# Patient Record
(demographics unavailable — no encounter records)

---

## 2024-12-13 NOTE — HISTORY OF PRESENT ILLNESS
[FreeTextEntry1] : 64yo M presents to f/u scrotal hematoma which accidentally formed after angioplasty complication 2 months ago. He states swelling was size of softball, now has gotten much better. He still reports pain

## 2024-12-13 NOTE — PHYSICAL EXAM
[Normal Appearance] : normal appearance [Well Groomed] : well groomed [General Appearance - In No Acute Distress] : no acute distress [Edema] : no peripheral edema [Respiration, Rhythm And Depth] : normal respiratory rhythm and effort [Exaggerated Use Of Accessory Muscles For Inspiration] : no accessory muscle use [Normal Station and Gait] : the gait and station were normal for the patient's age [] : no rash [No Focal Deficits] : no focal deficits [Oriented To Time, Place, And Person] : oriented to person, place, and time [Affect] : the affect was normal [Mood] : the mood was normal [de-identified] : right scrotal firmness and tenderness. Unable to fully appreciate testicle [Chaperone Present] : A chaperone was present in the examining room during all aspects of the physical examination [FreeTextEntry2] : Moe ORELLANA

## 2024-12-13 NOTE — PHYSICAL EXAM
[Normal Appearance] : normal appearance [Well Groomed] : well groomed [General Appearance - In No Acute Distress] : no acute distress [Edema] : no peripheral edema [Respiration, Rhythm And Depth] : normal respiratory rhythm and effort [Exaggerated Use Of Accessory Muscles For Inspiration] : no accessory muscle use [Normal Station and Gait] : the gait and station were normal for the patient's age [] : no rash [No Focal Deficits] : no focal deficits [Oriented To Time, Place, And Person] : oriented to person, place, and time [Affect] : the affect was normal [Mood] : the mood was normal [de-identified] : right scrotal firmness and tenderness. Unable to fully appreciate testicle [Chaperone Present] : A chaperone was present in the examining room during all aspects of the physical examination [FreeTextEntry2] : Moe ORELLANA

## 2024-12-13 NOTE — HISTORY OF PRESENT ILLNESS
[FreeTextEntry1] : 66yo M presents to f/u scrotal hematoma which accidentally formed after angioplasty complication 2 months ago. He states swelling was size of softball, now has gotten much better. He still reports pain

## 2024-12-13 NOTE — PHYSICAL EXAM
[Normal Appearance] : normal appearance [Well Groomed] : well groomed [General Appearance - In No Acute Distress] : no acute distress [Edema] : no peripheral edema [Respiration, Rhythm And Depth] : normal respiratory rhythm and effort [Exaggerated Use Of Accessory Muscles For Inspiration] : no accessory muscle use [Normal Station and Gait] : the gait and station were normal for the patient's age [] : no rash [No Focal Deficits] : no focal deficits [Oriented To Time, Place, And Person] : oriented to person, place, and time [Affect] : the affect was normal [Mood] : the mood was normal [de-identified] : right scrotal firmness and tenderness. Unable to fully appreciate testicle [Chaperone Present] : A chaperone was present in the examining room during all aspects of the physical examination [FreeTextEntry2] : Moe ORELLANA

## 2025-01-23 NOTE — HISTORY OF PRESENT ILLNESS
[FreeTextEntry1] : 66 yo male PMHx DM II, aortic stenosis, HLD, HTN, s/p CABG, presents for evaluation of PAD. Pt has a chronic left plantar foot wound. He had an angiogram with outside surgeon who states he was unable to access the anterior tibial artery. He had a left 1st toe amputation in July. He developed a left plantar foot wound in August 2024. He is currently on antibiotics. He denies any fever or chills.

## 2025-01-23 NOTE — ASSESSMENT
[FreeTextEntry1] : 64 yo male with chronic non-healing left foot wound and PAD.   Pt counseled above diagnosis  Plan for left leg angiogram with access via anterior tibial artery. The risks and benefits of the surgical procedure were discussed with patient, all questions were answered.  A total of 30 minutes was spent with patient and coordinating care  Medication instructions for angiogram:  Take 1/2 dose night time insulin and no insulin in morning of procedure. Do not take Trulicity for 3 days prior to procedure.

## 2025-02-13 NOTE — ASSESSMENT
[FreeTextEntry1] : 66 yo male with chronic non-healing left foot wound and PAD.   Pt counseled above diagnosis  Pt given rx for levofloxacin  Given rx for percocet and tramadol  Plan for left leg angiogram with access via anterior tibial artery. The risks and benefits of the surgical procedure were discussed with patient, all questions were answered.  A total of 30 minutes was spent with patient and coordinating care  Medication instructions for angiogram:  Take 1/2 dose night time insulin and no insulin in morning of procedure. Do not take Trulicity for 3 days prior to procedure.

## 2025-02-13 NOTE — PHYSICAL EXAM
[2+] : right 2+ [0] : left 0 [FreeTextEntry1] : left plantar foot wound 3 cm x 3 cm with granulation tissue in wound base

## 2025-02-13 NOTE — HISTORY OF PRESENT ILLNESS
[FreeTextEntry1] : 1/23/25: 66 yo male PMHx DM II, aortic stenosis, HLD, HTN, s/p CABG, presents for evaluation of PAD. Pt has a chronic left plantar foot wound. He had an angiogram with outside surgeon who states he was unable to access the anterior tibial artery. He had a left 1st toe amputation in July. He developed a left plantar foot wound in August 2024. He is currently on antibiotics. He denies any fever or chills.   2/13/25: Pt has increased pain in left foot. He also feels it has increased discharge. He has been having some chills, did not take his temperature.

## 2025-03-12 NOTE — ASSESSMENT
[FreeTextEntry1] : This patient with left foot osteomyelitis, and diabetes.  At the current time, we will maintain the course of Zosyn 4.5 g every 8 hours through April 6, 2025.  Will follow-up on his labs.  He is pending lab draws this week.  But due to his appointments with different doctors he was unable to arrange for home draw.    He needs to continue follow-up with endocrine for his diabetes to ensure that is in good control.  He understands.  We talked about foot amputation in general.  I expressed that in my opinion, it would be beneficial to try to save as much foot as possible.  1 amputation can lead to subsequent amputations if not will care for the future.  He understands.   Unable to assess

## 2025-03-12 NOTE — PHYSICAL EXAM
[General Appearance - Alert] : alert [General Appearance - In No Acute Distress] : in no acute distress [Sclera] : the sclera and conjunctiva were normal [PERRL With Normal Accommodation] : pupils were equal in size, round, reactive to light [Extraocular Movements] : extraocular movements were intact [Outer Ear] : the ears and nose were normal in appearance [Oropharynx] : the oropharynx was normal with no thrush [Neck Appearance] : the appearance of the neck was normal [Neck Cervical Mass (___cm)] : no neck mass was observed [Jugular Venous Distention Increased] : there was no jugular-venous distention [Thyroid Diffuse Enlargement] : the thyroid was not enlarged [Auscultation Breath Sounds / Voice Sounds] : lungs were clear to auscultation bilaterally [Heart Rate And Rhythm] : heart rate was normal and rhythm regular [Heart Sounds] : normal S1 and S2 [Murmurs] : no murmurs [Heart Sounds Gallop] : no gallops [Heart Sounds Pericardial Friction Rub] : no pericardial rub [Full Pulse] : the pedal pulses are present [Edema] : there was no peripheral edema [Bowel Sounds] : normal bowel sounds [Abdomen Soft] : soft [Abdomen Tenderness] : non-tender [Abdomen Mass (___ Cm)] : no abdominal mass palpated [Costovertebral Angle Tenderness] : no CVA tenderness [No Palpable Adenopathy] : no palpable adenopathy [Musculoskeletal - Swelling] : no joint swelling [Nail Clubbing] : no clubbing  or cyanosis of the fingernails [Motor Tone] : muscle strength and tone were normal [Skin Color & Pigmentation] : normal skin color and pigmentation [] : no rash [Cranial Nerves] : cranial nerves 2-12 were intact [No Focal Deficits] : no focal deficits [FreeTextEntry1] : Decree sensation of the feet. [Oriented To Time, Place, And Person] : oriented to person, place, and time [Affect] : the affect was normal

## 2025-03-12 NOTE — HISTORY OF PRESENT ILLNESS
[FreeTextEntry1] : This 65 year old M with PMHx of including but not limited to alcohol use, diabetes mellitus (on insulin), CHFrEF (20%), moderate to severe aortic stenosis, chronic left lower extremity wound, presents to the ER due to shortness of breath that has been worsening last few days. States he does feel relief when sleep at a recline. Last alcohol drink was previous evening around 8.30PM. Pt denies any diaphoresis, chest pain or tremors.  During ER course, cardiology was consulted. Patient will be admitted to medicine for further care. .he is breathing better now. ID called on 2/24/25 for a non healing LEFT foot ulcer. Per patient ulcer has been present since 8/2024. he is a diabetic. not the best control. A1C is 8.3His blood sugars are very uncontrolled and have been coming down with help of endocrine team. He previously followed an outpatient doctor and was on levaquin. .    Plan #Diabetic foot ulcer with infection.  #uncontrolled DM #suspicious for OM# chronic osteomyelitis-ESR 59 --> 73-CRP    3.29 --> 1.98  Regarding Diabetes and Vascular disease- needs adequate diabetes control to assist in infection control- most recent A1c is: 8.5S/P LLE angio and balloon angioplasty, LLE DP and AT signals on 2/27/25- Endocrine is following- continue to encourage adherence to diet and regimen  NEEDS to stop ETOH use, he will consider Patient intends on work as a  when he gets better. Plan:6 weeks of IV antibiotics he agrees  will need PICC Line  Zosyn (PIPERA TAZO) 4.5 grams Q 8H thru April 6, 2025  ** please note the updated dose. weekly labs: CBC w diff and CMP, ESR and CRP  last seen on 2/28/25  Since the last visit in the hospital he feels pretty well.  No fevers no chills.  His PICC line is functioning properly in the right arm.

## 2025-03-13 NOTE — ASSESSMENT
[FreeTextEntry1] : 66 yo male with LLE angiogram with posterior tibial artery angioplasty, chronic non-healing left foot wound and PAD.   Pt counseled on procedure performed  LLE UNNA boot applied. Santyl to ulcer RTC in 1 week to monitor wound

## 2025-03-13 NOTE — HISTORY OF PRESENT ILLNESS
[FreeTextEntry1] : 1/23/25: 64 yo male PMHx DM II, aortic stenosis, HLD, HTN, s/p CABG, presents for evaluation of PAD. Pt has a chronic left plantar foot wound. He had an angiogram with outside surgeon who states he was unable to access the anterior tibial artery. He had a left 1st toe amputation in July. He developed a left plantar foot wound in August 2024. He is currently on antibiotics. He denies any fever or chills.   2/13/25: Pt has increased pain in left foot. He also feels it has increased discharge. He has been having some chills, did not take his temperature.   3/13/25: Pt is s/p LLE angiogram with posterior tibial artery angioplasty. He has LLE edema. Feels the wound is about the same. No fever or chills.   PSHx:  2/27/25 LLE angiogram with posterior tibial artery angioplasty

## 2025-03-20 NOTE — HISTORY OF PRESENT ILLNESS
[FreeTextEntry1] : 1/23/25: 64 yo male PMHx DM II, aortic stenosis, HLD, HTN, s/p CABG, presents for evaluation of PAD. Pt has a chronic left plantar foot wound. He had an angiogram with outside surgeon who states he was unable to access the anterior tibial artery. He had a left 1st toe amputation in July. He developed a left plantar foot wound in August 2024. He is currently on antibiotics. He denies any fever or chills.   2/13/25: Pt has increased pain in left foot. He also feels it has increased discharge. He has been having some chills, did not take his temperature.   3/13/25: Pt is s/p LLE angiogram with posterior tibial artery angioplasty. He has LLE edema. Feels the wound is about the same. No fever or chills.   3/20/25: Pt had LLE UNNA boot for the past week. Feels the wound is about the same. No fever or chills.   PSHx:  2/27/25 LLE angiogram with posterior tibial artery angioplasty

## 2025-03-20 NOTE — ASSESSMENT
[FreeTextEntry1] : 64 yo male with LLE angiogram with posterior tibial artery angioplasty, chronic non-healing left foot wound and PAD.   Pt counseled on procedure performed  LLE UNNA boot applied. silvercele to wound. UNNA boot, abd pad.  RTC in 1 week to monitor wound

## 2025-04-02 NOTE — HISTORY OF PRESENT ILLNESS
[FreeTextEntry1] : This 65 year old M with PMHx of including but not limited to alcohol use, diabetes mellitus (on insulin), CHFrEF (20%), moderate to severe aortic stenosis, chronic left lower extremity wound, presents to the ER due to shortness of breath that has been worsening last few days. States he does feel relief when sleep at a recline. Last alcohol drink was previous evening around 8.30PM. Pt denies any diaphoresis, chest pain or tremors.  During ER course, cardiology was consulted. Patient will be admitted to medicine for further care. .he is breathing better now. ID called on 2/24/25 for a non healing LEFT foot ulcer. Per patient ulcer has been present since 8/2024. he is a diabetic. not the best control. A1C is 8.3His blood sugars are very uncontrolled and have been coming down with help of endocrine team. He previously followed an outpatient doctor and was on levaquin. .  He was seen in the hospital for diabetic foot infection of ulcer.  Suspicious for osteomyelitis.  At that time,   He was changed over to Zosyn (PIPERA TAZO) 4.5 grams Q 8H thru April 6, 2025 he was seen in the office for follow-up on March 12, 2025.  At that time the foot appears stable.  He is here for second follow-up.  He feels increased pain and recent foot movements.  He had a recent imaging that shows possible fracture in one of the bones.  He also tells me that there is increased drainage, foul-smelling.

## 2025-04-02 NOTE — ASSESSMENT
[FreeTextEntry1] : This patient with left foot osteomyelitis, and diabetes.  Originally, the plan was Zosyn 4.5 g every 8 hours through April 6, 2025. His foot has not improved significantly.  With worsening smell and odor, and mucoid discharge.  Will change the antibiotics over to meropenem 2 g every 8 hours.  Will do this for 28 days, ending around April 30, 2025.  We have informed [facility], same labs: CBC, CMP, ESR, CRP.  Patient is to come back here in about 2 weeks.  He understands.

## 2025-04-02 NOTE — DISCUSSION/SUMMARY
[FreeTextEntry1] : Spoke with Dianne at [facility] and advised them as per Dr. Godoy stop Zosyn and switch pt. to Meropenem 2 Grams every 8 hours x28 days and keep lab orders the same. This is pt's first dose of Meropenem and as per Dr. Godoy ok to order Benadryl IM PRN as per protocol.  As per Dianne anticipated start date will be 4/3/25 and confirmed with Dr. Godoy end date would be 4/30/25.

## 2025-04-02 NOTE — REVIEW OF SYSTEMS
[Joint Pain] : joint pain [As Noted in HPI] : as noted in HPI [Skin Wound] : skin wound [Negative] : Heme/Lymph

## 2025-04-02 NOTE — PHYSICAL EXAM
[General Appearance - Alert] : alert [General Appearance - In No Acute Distress] : in no acute distress [Sclera] : the sclera and conjunctiva were normal [PERRL With Normal Accommodation] : pupils were equal in size, round, reactive to light [Extraocular Movements] : extraocular movements were intact [Outer Ear] : the ears and nose were normal in appearance [Oropharynx] : the oropharynx was normal with no thrush [Neck Appearance] : the appearance of the neck was normal [Neck Cervical Mass (___cm)] : no neck mass was observed [Jugular Venous Distention Increased] : there was no jugular-venous distention [Thyroid Diffuse Enlargement] : the thyroid was not enlarged [Auscultation Breath Sounds / Voice Sounds] : lungs were clear to auscultation bilaterally [Heart Sounds] : normal S1 and S2 [Heart Rate And Rhythm] : heart rate was normal and rhythm regular [Heart Sounds Gallop] : no gallops [Murmurs] : no murmurs [Full Pulse] : the pedal pulses are present [Heart Sounds Pericardial Friction Rub] : no pericardial rub [Edema] : there was no peripheral edema [Bowel Sounds] : normal bowel sounds [Abdomen Soft] : soft [Abdomen Tenderness] : non-tender [Abdomen Mass (___ Cm)] : no abdominal mass palpated [Costovertebral Angle Tenderness] : no CVA tenderness [No Palpable Adenopathy] : no palpable adenopathy [Musculoskeletal - Swelling] : no joint swelling [Nail Clubbing] : no clubbing  or cyanosis of the fingernails [Motor Tone] : muscle strength and tone were normal [Skin Color & Pigmentation] : normal skin color and pigmentation [] : no rash [Cranial Nerves] : cranial nerves 2-12 were intact [No Focal Deficits] : no focal deficits [FreeTextEntry1] : Decree sensation of the feet. [Oriented To Time, Place, And Person] : oriented to person, place, and time [Affect] : the affect was normal

## 2025-04-16 NOTE — PHYSICAL EXAM
[General Appearance - Alert] : alert [General Appearance - In No Acute Distress] : in no acute distress [Sclera] : the sclera and conjunctiva were normal [PERRL With Normal Accommodation] : pupils were equal in size, round, reactive to light [Extraocular Movements] : extraocular movements were intact [Outer Ear] : the ears and nose were normal in appearance [Oropharynx] : the oropharynx was normal with no thrush [Neck Appearance] : the appearance of the neck was normal [Neck Cervical Mass (___cm)] : no neck mass was observed [Jugular Venous Distention Increased] : there was no jugular-venous distention [Thyroid Diffuse Enlargement] : the thyroid was not enlarged [Auscultation Breath Sounds / Voice Sounds] : lungs were clear to auscultation bilaterally [Heart Rate And Rhythm] : heart rate was normal and rhythm regular [Heart Sounds] : normal S1 and S2 [Heart Sounds Gallop] : no gallops [Murmurs] : no murmurs [Heart Sounds Pericardial Friction Rub] : no pericardial rub [Full Pulse] : the pedal pulses are present [Edema] : there was no peripheral edema [Bowel Sounds] : normal bowel sounds [Abdomen Soft] : soft [Abdomen Tenderness] : non-tender [Abdomen Mass (___ Cm)] : no abdominal mass palpated [Costovertebral Angle Tenderness] : no CVA tenderness [No Palpable Adenopathy] : no palpable adenopathy [Musculoskeletal - Swelling] : no joint swelling [Nail Clubbing] : no clubbing  or cyanosis of the fingernails [Motor Tone] : muscle strength and tone were normal [Skin Color & Pigmentation] : normal skin color and pigmentation [] : no rash [Cranial Nerves] : cranial nerves 2-12 were intact [No Focal Deficits] : no focal deficits [Oriented To Time, Place, And Person] : oriented to person, place, and time [Affect] : the affect was normal [FreeTextEntry1] : Decree sensation of the feet.

## 2025-04-16 NOTE — DISCUSSION/SUMMARY
[FreeTextEntry1] : Spoke with Dianne at [facility] and advised them as per Dr. Godoy stop Zosyn and switch pt. to Meropenem 2 Grams every 8 hours x28 days and keep lab orders the same. This is pt's first dose of Meropenem and as per Dr. Godoy ok to order Benadryl IM PRN as per protocol.  As per Dianne anticipated start date will be 4/3/25 and confirmed with Dr. Godoy end date would be 4/30/25.

## 2025-04-16 NOTE — HISTORY OF PRESENT ILLNESS
[FreeTextEntry1] : This 65 year old M with PMHx of including but not limited to alcohol use, diabetes mellitus (on insulin), CHFrEF (20%), moderate to severe aortic stenosis, chronic left lower extremity wound, presents to the ER due to shortness of breath that has been worsening last few days. States he does feel relief when sleep at a recline. Last alcohol drink was previous evening around 8.30PM. Pt denies any diaphoresis, chest pain or tremors.  During ER course, cardiology was consulted. Patient will be admitted to medicine for further care. .he is breathing better now. ID called on 2/24/25 for a non healing LEFT foot ulcer. Per patient ulcer has been present since 8/2024. he is a diabetic. not the best control. A1C is 8.3His blood sugars are very uncontrolled and have been coming down with help of endocrine team. He previously followed an outpatient doctor and was on levaquin. .  He was seen in the hospital for diabetic foot infection of ulcer.  Suspicious for osteomyelitis.  At that time,   He was changed over to Zosyn (PIPERA TAZO) 4.5 grams Q 8H thru April 6, 2025 he was seen in the office for follow-up on March 12, 2025.  He had a second follow-up on April 2, 2025.  Afterwards he had follow-up with his vascular surgeon. About 1 week ago on April 10, 2025 he had surgery done and had a transmetatarsal amputation done at [facility].  It is still a lot of pain.  He has been walking on the stump.  And using his diesel truck.

## 2025-04-16 NOTE — ASSESSMENT
[FreeTextEntry1] : This patient with left foot osteomyelitis, and diabetes. Originally, the plan was Zosyn 4.5 g every 8 hours through April 6, 2025. then changed to meropenem 2 g every 8 hours.  thru April 30, 2025   s/p transmetatarsal amputation on 4/10/2025 at [facility] with Dr. Wright's colleague  Currently he is on doxycycline by mouth.  Will add Augmentin twice daily.  I spoke to the patient at length regarding weightbearing situation, asking when to use this walker accordingly.  This will help allow the foot to heal properly.  I explained to him that the skin will heal first, but it takes a long time for the connective tissue to properly reorganize and heal.  He understands.  I asked him to come back in 2 weeks for a reassessment.

## 2025-04-30 NOTE — DISCUSSION/SUMMARY
[FreeTextEntry1] : Spoke with Dianne at [facility] and advised them as per Dr. Godoy stop Zosyn and switch pt. to Meropenem 2 Grams every 8 hours x28 days and keep lab orders the same. This is pt's first dose of Meropenem and as per Dr. Godoy ok to order Benadryl IM PRN as per protocol.  As per Dianne anticipated start date will be 4/3/25 and confirmed with Dr. Godoy end date would be 4/30/25.

## 2025-04-30 NOTE — DISCUSSION/SUMMARY
[FreeTextEntry1] : Spoke with Dianne at [facility] and advised them as per Dr. Godoy stop Zosyn and switch pt. to Meropenem 2 Grams every 8 hours x28 days and keep lab orders the same. This is pt's first dose of Meropenem and as per Dr. Godoy ok to order Benadryl IM PRN as per protocol.  As per Dianne anticipated start date will be 4/3/25 and confirmed with Dr. Godoy end date would be 4/30/25.

## 2025-04-30 NOTE — PHYSICAL EXAM
[General Appearance - Alert] : alert [General Appearance - In No Acute Distress] : in no acute distress [Sclera] : the sclera and conjunctiva were normal [PERRL With Normal Accommodation] : pupils were equal in size, round, reactive to light [Extraocular Movements] : extraocular movements were intact [Outer Ear] : the ears and nose were normal in appearance [Oropharynx] : the oropharynx was normal with no thrush [Neck Appearance] : the appearance of the neck was normal [Neck Cervical Mass (___cm)] : no neck mass was observed [Jugular Venous Distention Increased] : there was no jugular-venous distention [Thyroid Diffuse Enlargement] : the thyroid was not enlarged [Auscultation Breath Sounds / Voice Sounds] : lungs were clear to auscultation bilaterally [Heart Rate And Rhythm] : heart rate was normal and rhythm regular [Heart Sounds] : normal S1 and S2 [Heart Sounds Gallop] : no gallops [Murmurs] : no murmurs [Heart Sounds Pericardial Friction Rub] : no pericardial rub [Full Pulse] : the pedal pulses are present [Edema] : there was no peripheral edema [Bowel Sounds] : normal bowel sounds [Abdomen Soft] : soft [Abdomen Tenderness] : non-tender [Abdomen Mass (___ Cm)] : no abdominal mass palpated [Costovertebral Angle Tenderness] : no CVA tenderness [No Palpable Adenopathy] : no palpable adenopathy [Musculoskeletal - Swelling] : no joint swelling [Nail Clubbing] : no clubbing  or cyanosis of the fingernails [Motor Tone] : muscle strength and tone were normal [Skin Color & Pigmentation] : normal skin color and pigmentation [] : no rash [Cranial Nerves] : cranial nerves 2-12 were intact [No Focal Deficits] : no focal deficits [Oriented To Time, Place, And Person] : oriented to person, place, and time [Affect] : the affect was normal [FreeTextEntry1] : decreaed sensation of the feet.

## 2025-04-30 NOTE — ASSESSMENT
[FreeTextEntry1] : Plan:  Continue wound care.  Suggest wound care once daily if possible and apply some iodine/Betadine to the area to keep it sanitary.  I have suggested to continue taking oral Augmentin twice a day to 875 mg, doxycycline 100 mg twice a day.  He can come back in about 2 weeks.  I want to stop the antibiotics once the wound is completely healed.  He understands.  We spoke about sun sensitization of the skin on the doxycycline.  I asked him to use sunblock when going outdoors on the doxycycline.  He understands.

## 2025-04-30 NOTE — HISTORY OF PRESENT ILLNESS
[FreeTextEntry1] : This is a 65-year-old man with history of alcohol use, diabetes, CHF with reduced EF of 20%, moderate to severe aortic stenosis, chronic left lower extremity wound,  He was seen in the hospital on 2/24/2025 for a nonhealing left foot ulcer that been present since August 2024.  He had previously been given a course of Levaquin with no significant improvement.  At that visit his A1c was 8.3.  There was a suspicion for osteomyelitis at that time.  He initially been recommended a course of Zosyn 4.5 g every 8 hours via IV line through April 6, 2025.  He had follow-up in the office before the IV antibiotics..  His first visit was on 3/12/2025, a second visit on 4/2/2025. During his 4/2/2025 visit, there was poor wound healing, and the Zosyn was changed over to meropenem with the proposed end date of 4/30/2025.  Circa 4/10/2025, patient had a transmetatarsal amputation done at [facility] with Dr. Wright's colleague.  He was last seen here on 4/16/2025.  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 We decided to give him Augmentin and doxycycline to ensure good wound healing.  Since last visit, he still taking the Augmentin.  But he ran out of doxycycline.  His surgeon has taken a few more sutures.    He is getting wound care For health services, he is not walking on his foot as much as he was before and taking it a lot easier than prior.

## 2025-04-30 NOTE — HISTORY OF PRESENT ILLNESS
[FreeTextEntry1] : This is a 65-year-old man with history of alcohol use, diabetes, CHF with reduced EF of 20%, moderate to severe aortic stenosis, chronic left lower extremity wound,  He was seen in the hospital on 2/24/2025 for a nonhealing left foot ulcer that been present since August 2024.  He had previously been given a course of Levaquin with no significant improvement.  At that visit his A1c was 8.3.  There was a suspicion for osteomyelitis at that time.  He initially been recommended a course of Zosyn 4.5 g every 8 hours via IV line through April 6, 2025.  He had follow-up in the office before the IV antibiotics..  His first visit was on 3/12/2025, a second visit on 4/2/2025. During his 4/2/2025 visit, there was poor wound healing, and the Zosyn was changed over to meropenem with the proposed end date of 4/30/2025.  Circa 4/10/2025, patient had a transmetatarsal amputation done at [facility] with Dr. Wright's colleague.  He was last seen here on 4/16/2025.  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 We decided to give him Augmentin and doxycycline to ensure good wound healing.  Since last visit, he still taking the Augmentin.  But he ran out of doxycycline.  His surgeon has taken a few more sutures.    He is getting wound care For health services, he is not walking on his foot as much as he was before and taking it a lot easier than prior.

## 2025-05-14 NOTE — DISCUSSION/SUMMARY
[FreeTextEntry1] : Spoke with Dianne at [facility] and advised them as per Dr. Godoy stop Zosyn and switch pt. to Meropenem 2 Grams every 8 hours x28 days and keep lab orders the same. This is pt's first dose of Meropenem and as per Dr. Godoy ok to order Benadryl IM PRN as per protocol.  As per Dianne anticipated start date will be 4/3/25 and confirmed with Dr. Godoy end date would be 4/30/25.

## 2025-05-14 NOTE — PHYSICAL EXAM
[General Appearance - Alert] : alert [General Appearance - In No Acute Distress] : in no acute distress [Sclera] : the sclera and conjunctiva were normal [PERRL With Normal Accommodation] : pupils were equal in size, round, reactive to light [Extraocular Movements] : extraocular movements were intact [Outer Ear] : the ears and nose were normal in appearance [Oropharynx] : the oropharynx was normal with no thrush [Neck Appearance] : the appearance of the neck was normal [Neck Cervical Mass (___cm)] : no neck mass was observed [Jugular Venous Distention Increased] : there was no jugular-venous distention [Thyroid Diffuse Enlargement] : the thyroid was not enlarged [Auscultation Breath Sounds / Voice Sounds] : lungs were clear to auscultation bilaterally [Heart Rate And Rhythm] : heart rate was normal and rhythm regular [Heart Sounds] : normal S1 and S2 [Heart Sounds Gallop] : no gallops [Murmurs] : no murmurs [Heart Sounds Pericardial Friction Rub] : no pericardial rub [Full Pulse] : the pedal pulses are present [Edema] : there was no peripheral edema [Abdomen Soft] : soft [Bowel Sounds] : normal bowel sounds [Abdomen Tenderness] : non-tender [Abdomen Mass (___ Cm)] : no abdominal mass palpated [Costovertebral Angle Tenderness] : no CVA tenderness [No Palpable Adenopathy] : no palpable adenopathy [Nail Clubbing] : no clubbing  or cyanosis of the fingernails [Musculoskeletal - Swelling] : no joint swelling [Motor Tone] : muscle strength and tone were normal [Skin Color & Pigmentation] : normal skin color and pigmentation [] : no rash [Cranial Nerves] : cranial nerves 2-12 were intact [No Focal Deficits] : no focal deficits [Oriented To Time, Place, And Person] : oriented to person, place, and time [Affect] : the affect was normal [FreeTextEntry1] : decreaed sensation of the feet.

## 2025-05-14 NOTE — HISTORY OF PRESENT ILLNESS
[FreeTextEntry1] : This is a 65-year-old man with history of alcohol use, diabetes, CHF with reduced EF of 20%, moderate to severe aortic stenosis, chronic left lower extremity wound,  He was seen in the hospital on 2/24/2025 for a nonhealing left foot ulcer that been present since August 2024.  He had previously been given a course of Levaquin with no significant improvement.  At that visit his A1c was 8.3.  There was a suspicion for osteomyelitis at that time.  He initially been recommended a course of Zosyn 4.5 g every 8 hours via IV line through April 6, 2025.  He had follow-up in the office before the IV antibiotics..  His first visit was on 3/12/2025, a second visit on 4/2/2025. During his 4/2/2025 visit, there was poor wound healing, and the Zosyn was changed over to meropenem with the proposed end date of 4/30/2025.  Circa 4/10/2025, patient had a transmetatarsal amputation done at [facility] with Dr. Wright's colleague.  He was last seen here on 4/16/2025.  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 We decided to give him Augmentin and doxycycline to ensure good wound healing.   Since the last visit, patient has all suture removed.   no issues with antibiotics.  no drainage from the foot/ stump.

## 2025-05-14 NOTE — ASSESSMENT
[FreeTextEntry1] : Continue wound care.  Suggest wound care once daily if possible and apply some iodine/Betadine to the area to keep it sanitary.  I have suggested to continue taking oral Augmentin twice a day to 875 mg, doxycycline 100 mg twice a day.   - he should take this for 3-4 more weeks.   We spoke about sun sensitization of the skin on the doxycycline.  I asked him to use sunblock when going outdoors on the doxycycline.  He understands.    he should have a follow up in 1 month. anticipate that it would be the last follow up.

## 2025-05-29 NOTE — REASON FOR VISIT
[Follow-Up: _____] : a [unfilled] follow-up visit [FreeTextEntry1] : chronic left foot ulcer osteomyelitis and recent amputation at [place] in April

## 2025-05-29 NOTE — HISTORY OF PRESENT ILLNESS
[FreeTextEntry1] : This is a 65-year-old man with history of alcohol use, diabetes, CHF with reduced EF of 20%, moderate to severe aortic stenosis, chronic left lower extremity wound,  He was seen in the hospital on 2/24/2025 for a nonhealing left foot ulcer that been present since August 2024.  He had previously been given a course of Levaquin with no significant improvement.  At that visit his A1c was 8.3.  There was a suspicion for osteomyelitis at that time.  He initially been recommended a course of Zosyn 4.5 g every 8 hours via IV line through April 6, 2025.  He had follow-up in the office before the IV antibiotics..  His first visit was on 3/12/2025, a second visit on 4/2/2025. During his 4/2/2025 visit, there was poor wound healing, and the Zosyn was changed over to meropenem with the proposed end date of 4/30/2025.  Circa 4/10/2025, patient had a transmetatarsal amputation done at [facility] with Dr. Wright's colleague.  He was last seen here on 4/16/2025.  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 We decided to give him Augmentin and doxycycline to ensure good wound healing.  During his last visit in this office he was s/p suture removal and had no issues with antibiotics and no drainage from his stump.  Today he presents at the end of his course of doxycycline and augmentin (to end tomorrow 5/30), and reports that he was recently admitted to [facility] a little over a week ago for 6 days and had a surgical intervention on his left foot.  He was told to follow up with ID but has no hospital records with him. Request to be sent to [place] to update history in chart as patient is a limited historian.

## 2025-05-29 NOTE — PHYSICAL EXAM
[General Appearance - Alert] : alert [General Appearance - In No Acute Distress] : in no acute distress [Sclera] : the sclera and conjunctiva were normal [PERRL With Normal Accommodation] : pupils were equal in size, round, reactive to light [Extraocular Movements] : extraocular movements were intact [Outer Ear] : the ears and nose were normal in appearance [Oropharynx] : the oropharynx was normal with no thrush [Neck Appearance] : the appearance of the neck was normal [Neck Cervical Mass (___cm)] : no neck mass was observed [Jugular Venous Distention Increased] : there was no jugular-venous distention [Thyroid Diffuse Enlargement] : the thyroid was not enlarged [Auscultation Breath Sounds / Voice Sounds] : lungs were clear to auscultation bilaterally [Heart Rate And Rhythm] : heart rate was normal and rhythm regular [Heart Sounds Gallop] : no gallops [Murmurs] : no murmurs [Heart Sounds Pericardial Friction Rub] : no pericardial rub [Edema] : there was no peripheral edema [Full Pulse] : the pedal pulses are present [Bowel Sounds] : normal bowel sounds [Abdomen Soft] : soft [Abdomen Tenderness] : non-tender [Abdomen Mass (___ Cm)] : no abdominal mass palpated [Costovertebral Angle Tenderness] : no CVA tenderness [No Palpable Adenopathy] : no palpable adenopathy [Musculoskeletal - Swelling] : no joint swelling [Nail Clubbing] : no clubbing  or cyanosis of the fingernails [Motor Tone] : muscle strength and tone were normal [Skin Color & Pigmentation] : normal skin color and pigmentation [] : no rash [Cranial Nerves] : cranial nerves 2-12 were intact [No Focal Deficits] : no focal deficits [Oriented To Time, Place, And Person] : oriented to person, place, and time [Affect] : the affect was normal [FreeTextEntry1] : decreaed sensation of the feet.

## 2025-05-29 NOTE — ASSESSMENT
[Treatment Education] : treatment education [Treatment Adherence] : treatment adherence [Rx Dose / Side Effects] : Rx dose/side effects [Risk Reduction] : risk reduction [Nutritional / Food Issues] : nutritional/food issues [Universal Precautions] : universal precautions [Medical Care Issues] : medical care issues [Drug Interactions / Side Effects] : drug interactions/side effects [Anticipatory Guidance] : anticipatory guidance [FreeTextEntry1] : IONA ESPOSITO is a 65 year old male being seen for a left foot wound follow-up visit, chronic left foot ulcer osteomyelitis and recent amputation at [place] in April. and again in May.  #Chronic osteomyelitis left foot #open surgical wound left foot #Uncontrolled diabetes type 2 with hyperglycemia #Diabetic foot ulcer sequela -Continue wound daily wound care: iodine/Betadine to the area to keep it sanitary. cover with gauze and ace wrap -Follow up with endocrinology for blood sugar control -Review records when received from [place], including imaging, cultures, and modify antibiotic regimen if needed -F/U wound progress during next visit on or about June 11th 2025. -encouraged diet, exercise, good sleep, increased fiber intake, lower sugar and less processed foods, and cut back on opiate pain medications if able to prevent constipation and side effects. -Encouraged use of suscreen as a daily moisturizer to prevent sunburn while taking doxycycline and to keep dry skin intact

## 2025-06-11 NOTE — ASSESSMENT
[FreeTextEntry1] : Status post the left metatarsal amputation.  The foot wound looks much better today.  After removal of sutures on Friday, June 13, 2025, he can continue 1 more week of antibiotics.  I asked him to apply Betadine to the surgical suture edges until all the scabs have fallen off.  He understands.

## 2025-06-11 NOTE — PHYSICAL EXAM
[General Appearance - Alert] : alert [General Appearance - In No Acute Distress] : in no acute distress [Sclera] : the sclera and conjunctiva were normal [PERRL With Normal Accommodation] : pupils were equal in size, round, reactive to light [Extraocular Movements] : extraocular movements were intact [Outer Ear] : the ears and nose were normal in appearance [Oropharynx] : the oropharynx was normal with no thrush [Neck Appearance] : the appearance of the neck was normal [Neck Cervical Mass (___cm)] : no neck mass was observed [Jugular Venous Distention Increased] : there was no jugular-venous distention [Thyroid Diffuse Enlargement] : the thyroid was not enlarged [Auscultation Breath Sounds / Voice Sounds] : lungs were clear to auscultation bilaterally [Heart Rate And Rhythm] : heart rate was normal and rhythm regular [Heart Sounds] : normal S1 and S2 [Heart Sounds Gallop] : no gallops [Murmurs] : no murmurs [Heart Sounds Pericardial Friction Rub] : no pericardial rub [Full Pulse] : the pedal pulses are present [Edema] : there was no peripheral edema [Bowel Sounds] : normal bowel sounds [Abdomen Soft] : soft [Abdomen Tenderness] : non-tender [Abdomen Mass (___ Cm)] : no abdominal mass palpated [Costovertebral Angle Tenderness] : no CVA tenderness [No Palpable Adenopathy] : no palpable adenopathy [Musculoskeletal - Swelling] : no joint swelling [Nail Clubbing] : no clubbing  or cyanosis of the fingernails [Motor Tone] : muscle strength and tone were normal [Skin Color & Pigmentation] : normal skin color and pigmentation [] : no rash [Cranial Nerves] : cranial nerves 2-12 were intact [No Focal Deficits] : no focal deficits [Oriented To Time, Place, And Person] : oriented to person, place, and time [FreeTextEntry1] : decreaed sensation of the feet. [Affect] : the affect was normal

## 2025-06-11 NOTE — HISTORY OF PRESENT ILLNESS
[FreeTextEntry1] : This 65-year-old man is here for follow-up status post a left fifth metatarsal amputation.  He is doing well.  He was recently given a course of Augmentin and doxycycline due to suspected poor wound healing.  He reported that the Zyvox/linezolid did not number on his stomach but is better now.  He is pending follow-up with the surgical team for suture removal on June 13, 2025.

## 2025-06-13 NOTE — HISTORY OF PRESENT ILLNESS
[FreeTextEntry1] : 1/23/25: 66 yo male PMHx DM II, aortic stenosis, HLD, HTN, s/p CABG, presents for evaluation of PAD. Pt has a chronic left plantar foot wound. He had an angiogram with outside surgeon who states he was unable to access the anterior tibial artery. He had a left 1st toe amputation in July. He developed a left plantar foot wound in August 2024. He is currently on antibiotics. He denies any fever or chills.   2/13/25: Pt has increased pain in left foot. He also feels it has increased discharge. He has been having some chills, did not take his temperature.   3/13/25: Pt is s/p LLE angiogram with posterior tibial artery angioplasty. He has LLE edema. Feels the wound is about the same. No fever or chills.   3/20/25: Pt had LLE UNNA boot for the past week. Feels the wound is about the same. No fever or chills.   6/12/25: Pt doing well since last visit.  PSHx:  2/27/25 LLE angiogram with posterior tibial artery angioplasty

## 2025-06-13 NOTE — ASSESSMENT
[FreeTextEntry1] : 64 yo male with LLE angiogram with posterior tibial artery angioplasty, chronic non-healing left foot wound and PAD.   Pt counseled on procedure performed

## 2025-07-22 NOTE — END OF VISIT
[Time Spent: ___ minutes] : I have spent [unfilled] minutes of time on the encounter which excludes teaching and separately reported services.
Adult

## 2025-07-22 NOTE — HISTORY OF PRESENT ILLNESS
[FreeTextEntry1] : This 65-year-old male presented for follow-up after a left fifth metatarsal amputation. He is reportedly doing well. Due to concerns for delayed wound healing, he recently completed a course of Augmentin and doxycycline. He reported prior gastrointestinal intolerance to linezolid, which has since resolved.  At his previous visit on 6/11/25, the amputation site appeared improved.  He is scheduled for suture removal on 6/13/25 with his surgical team.  He was instructed to continue antibiotics for one week following suture removal and to apply Betadine to the surgical site until scab resolution.  He understands these instructions.